# Patient Record
Sex: MALE | Race: WHITE | NOT HISPANIC OR LATINO | Employment: OTHER | URBAN - METROPOLITAN AREA
[De-identification: names, ages, dates, MRNs, and addresses within clinical notes are randomized per-mention and may not be internally consistent; named-entity substitution may affect disease eponyms.]

---

## 2018-10-23 ENCOUNTER — HOSPITAL ENCOUNTER (EMERGENCY)
Facility: HOSPITAL | Age: 38
Discharge: HOME | End: 2018-10-23
Attending: EMERGENCY MEDICINE
Payer: MEDICAID

## 2018-10-23 VITALS
DIASTOLIC BLOOD PRESSURE: 68 MMHG | RESPIRATION RATE: 16 BRPM | HEIGHT: 72 IN | OXYGEN SATURATION: 98 % | BODY MASS INDEX: 26.57 KG/M2 | SYSTOLIC BLOOD PRESSURE: 131 MMHG | WEIGHT: 196.19 LBS | TEMPERATURE: 98 F | HEART RATE: 56 BPM

## 2018-10-23 DIAGNOSIS — S62.501A CLOSED NONDISPLACED FRACTURE OF PHALANX OF RIGHT THUMB, UNSPECIFIED PHALANX, INITIAL ENCOUNTER: Primary | ICD-10-CM

## 2018-10-23 PROCEDURE — 25000003 PHARM REV CODE 250: Performed by: NURSE PRACTITIONER

## 2018-10-23 PROCEDURE — 29125 APPL SHORT ARM SPLINT STATIC: CPT

## 2018-10-23 PROCEDURE — 29130 APPL FINGER SPLINT STATIC: CPT | Mod: F5

## 2018-10-23 PROCEDURE — 99283 EMERGENCY DEPT VISIT LOW MDM: CPT | Mod: 25

## 2018-10-23 RX ORDER — HYDROCODONE BITARTRATE AND ACETAMINOPHEN 7.5; 325 MG/1; MG/1
1 TABLET ORAL EVERY 4 HOURS PRN
Qty: 12 TABLET | Refills: 0 | Status: SHIPPED | OUTPATIENT
Start: 2018-10-23

## 2018-10-23 RX ORDER — HYDROCODONE BITARTRATE AND ACETAMINOPHEN 10; 325 MG/1; MG/1
1 TABLET ORAL
Status: COMPLETED | OUTPATIENT
Start: 2018-10-23 | End: 2018-10-23

## 2018-10-23 RX ADMIN — HYDROCODONE BITARTRATE AND ACETAMINOPHEN 1 TABLET: 10; 325 TABLET ORAL at 12:10

## 2018-10-23 NOTE — ED PROVIDER NOTES
"Encounter Date: 10/23/2018       History     Chief Complaint   Patient presents with    Hand Pain     pt c/o pain to his Rt thumb, pt states he was working on his truck when a bolt broke and his thumb was going a different direction so he "popped it back and heard a lot of snaps"     38 year old male with complaint of right thumb pain since last night.  Pt reports that he struck his thumb on a metal bar as he attempted to remove bolt from back of truck.  Reports immediate onset of pain.  Worse with movement. No radiation of pain.  Constant aching pain.            Review of patient's allergies indicates:   Allergen Reactions    Fentanyl      History reviewed. No pertinent past medical history.  History reviewed. No pertinent surgical history.  History reviewed. No pertinent family history.  Social History     Tobacco Use    Smoking status: Never Smoker    Smokeless tobacco: Never Used   Substance Use Topics    Alcohol use: No     Frequency: Never    Drug use: Not on file     Review of Systems   Constitutional: Negative for fever.   HENT: Negative for sore throat.    Respiratory: Negative for shortness of breath.    Cardiovascular: Negative for chest pain.   Gastrointestinal: Negative for nausea.   Genitourinary: Negative for dysuria.   Musculoskeletal: Negative for back pain.        Right thumb pain    Skin: Negative for rash.   Neurological: Negative for weakness.   Hematological: Does not bruise/bleed easily.       Physical Exam     Initial Vitals [10/23/18 1144]   BP Pulse Resp Temp SpO2   (!) 140/83 (!) 58 18 98 °F (36.7 °C) 97 %      MAP       --         Physical Exam    Nursing note and vitals reviewed.  Constitutional: He appears well-developed and well-nourished.   HENT:   Head: Normocephalic and atraumatic.   Eyes: Conjunctivae are normal. Pupils are equal, round, and reactive to light.   Neck: Normal range of motion. Neck supple.   Cardiovascular: Normal rate, regular rhythm, normal heart sounds and " intact distal pulses.   Pulmonary/Chest: Breath sounds normal.   Abdominal: Soft. There is no rebound and no guarding.   Musculoskeletal: Normal range of motion.   Tenderness and bruising distal volar right thumb, no deformities, cap refill brisk   Neurological: He is alert.   Skin: Skin is warm and dry.   Psychiatric: He has a normal mood and affect. His behavior is normal. Thought content normal.         ED Course   Splint Application  Date/Time: 10/23/2018 12:26 PM  Performed by: Edgardo Meek NP  Authorized by: Nate Vizcarra MD   Comments: OCL thumb spica splint applied to right thumb: alignment good, neurovascular status intact        Labs Reviewed - No data to display       Imaging Results          X-Ray Hand 3 view Right (Final result)  Result time 10/23/18 12:16:06    Final result by Moses Herrera MD (10/23/18 12:16:06)                 Impression:      Nondisplaced fracture of the distal phalanx right thumb.      Electronically signed by: Moses Herrera MD  Date:    10/23/2018  Time:    12:16             Narrative:    EXAMINATION:  XR HAND COMPLETE 3 VIEW RIGHT    CLINICAL HISTORY:  Right thumb pain    TECHNIQUE:  Standard radiography performed.    COMPARISON:  None    FINDINGS:  There is a nondisplaced fracture of the shaft of the distal phalanx of the right thumb.    Deformity of the 5th metacarpal can be consistent with an old healed 5th metacarpal fracture.                                     Imaging Results          X-Ray Hand 3 view Right (Final result)  Result time 10/23/18 12:16:06    Final result by Moses Herrera MD (10/23/18 12:16:06)                 Impression:      Nondisplaced fracture of the distal phalanx right thumb.      Electronically signed by: Moses Herrera MD  Date:    10/23/2018  Time:    12:16             Narrative:    EXAMINATION:  XR HAND COMPLETE 3 VIEW RIGHT    CLINICAL HISTORY:  Right thumb pain    TECHNIQUE:  Standard radiography  performed.    COMPARISON:  None    FINDINGS:  There is a nondisplaced fracture of the shaft of the distal phalanx of the right thumb.    Deformity of the 5th metacarpal can be consistent with an old healed 5th metacarpal fracture.                                                  Clinical Impression:   The encounter diagnosis was Closed nondisplaced fracture of phalanx of right thumb, unspecified phalanx, initial encounter.                             Edgardo Meek NP  10/23/18 5846

## 2019-04-16 ENCOUNTER — HOSPITAL ENCOUNTER (EMERGENCY)
Facility: HOSPITAL | Age: 39
Discharge: HOME | End: 2019-04-16
Attending: EMERGENCY MEDICINE
Payer: MEDICAID

## 2019-04-16 VITALS
HEART RATE: 58 BPM | DIASTOLIC BLOOD PRESSURE: 71 MMHG | BODY MASS INDEX: 29.48 KG/M2 | WEIGHT: 199.06 LBS | TEMPERATURE: 98 F | HEIGHT: 69 IN | SYSTOLIC BLOOD PRESSURE: 136 MMHG | RESPIRATION RATE: 16 BRPM | OXYGEN SATURATION: 98 %

## 2019-04-16 DIAGNOSIS — M25.519 SHOULDER PAIN, ACUTE: Primary | ICD-10-CM

## 2019-04-16 PROCEDURE — 25000003 PHARM REV CODE 250: Performed by: PHYSICIAN ASSISTANT

## 2019-04-16 PROCEDURE — 99284 EMERGENCY DEPT VISIT MOD MDM: CPT | Mod: 25

## 2019-04-16 RX ORDER — METHYLPREDNISOLONE 4 MG/1
TABLET ORAL
Qty: 1 PACKAGE | Refills: 0 | Status: SHIPPED | OUTPATIENT
Start: 2019-04-16 | End: 2019-05-07

## 2019-04-16 RX ORDER — IBUPROFEN 800 MG/1
800 TABLET ORAL
Status: COMPLETED | OUTPATIENT
Start: 2019-04-16 | End: 2019-04-16

## 2019-04-16 RX ORDER — DICLOFENAC SODIUM 50 MG/1
50 TABLET, DELAYED RELEASE ORAL 2 TIMES DAILY
Qty: 20 TABLET | Refills: 0 | OUTPATIENT
Start: 2019-04-16 | End: 2023-11-18

## 2019-04-16 RX ADMIN — IBUPROFEN 800 MG: 800 TABLET, FILM COATED ORAL at 04:04

## 2019-04-16 NOTE — ED PROVIDER NOTES
"SCRIBE #1 NOTE: I, Corinne Mack, am scribing for, and in the presence of, JOSE Upton. I have scribed the entire note.      History      Chief Complaint   Patient presents with    Shoulder Pain     pt c/o pain to his Lt shoulder after working in the yard today, pt states he hear a "pop"       Review of patient's allergies indicates:   Allergen Reactions    Fentanyl         HPI   HPI    4/16/2019, 3:22 PM   History obtained from the patient      History of Present Illness: Oleksandr Stewart is a 38 y.o. male patient who presents to the Emergency Department for R shoulder pain which onset suddenly today. Pt was working in his yard when he lifted his RUE and heard a "pop". Pt is now having pain and DROM secondary to pain. Symptoms are constant and moderate in severity. Movement and palpation worsens the pt's pain. No mitigating factors reported. No associated sxs reported. Patient denies any fever, chills, CP, SOB, N/V/D, abd pain, back pain, neck pain, HA, dizziness, and all other sxs at this time. No prior Tx reported. No further complaints or concerns at this time.         Arrival mode: Personal vehicle    PCP: [facility]       Past Medical History:  History reviewed. No pertinent medical history.    Past Surgical History:  History reviewed. No pertinent surgical history.    Family History:  History reviewed. No pertinent family history.    Social History:  Social History     Tobacco Use    Smoking status: Never Smoker    Smokeless tobacco: Never Used   Substance and Sexual Activity    Alcohol use: No     Frequency: Never    Drug use: Unknown    Sexual activity: Unknown       ROS   Review of Systems   Constitutional: Negative for chills and fever.   Respiratory: Negative for cough and shortness of breath.    Cardiovascular: Negative for chest pain and leg swelling.   Gastrointestinal: Negative for abdominal pain, diarrhea, nausea and vomiting.   Musculoskeletal: Positive for arthralgias " "(R shoulder). Negative for back pain, neck pain and neck stiffness.   Skin: Negative for rash and wound.   Neurological: Negative for dizziness, light-headedness, numbness and headaches.   All other systems reviewed and are negative.    Physical Exam      Initial Vitals [04/16/19 1444]   BP Pulse Resp Temp SpO2   136/71 (!) 58 16 98 °F (36.7 °C) 98 %      MAP       --          Physical Exam  Nursing Notes and Vital Signs Reviewed.  Constitutional: Patient is in no acute distress. Well-developed and well-nourished.  Head: Atraumatic. Normocephalic.  Eyes: PERRL. EOM intact. Conjunctivae are not pale. No scleral icterus.  ENT: Mucous membranes are moist. Oropharynx is clear and symmetric.    Neck: Supple. Full ROM. No lymphadenopathy.  Cardiovascular: Regular rate. Regular rhythm. No murmurs, rubs, or gallops. Distal pulses are 2+ and symmetric.  Pulmonary/Chest: No respiratory distress. Clear to auscultation bilaterally. No wheezing or rales.  Abdominal: Soft and non-distended.  There is no tenderness.  No rebound, guarding, or rigidity.  Musculoskeletal: Moves all extremities. No obvious deformities. No edema.  R shoulder DROM secondary to pain. Anterior R shoulder TTP.  Skin: Warm and dry.  Neurological:  Alert, awake, and appropriate.  Normal speech.  No acute focal neurological deficits are appreciated.  Psychiatric: Normal affect. Good eye contact. Appropriate in content.    ED Course    Procedures  ED Vital Signs:  Vitals:    04/16/19 1444   BP: 136/71   Pulse: (!) 58   Resp: 16   Temp: 98 °F (36.7 °C)   TempSrc: Oral   SpO2: 98%   Weight: 90.3 kg (199 lb 1.2 oz)   Height: 5' 9" (1.753 m)       Abnormal Lab Results:  Labs Reviewed - No data to display     All Lab Results:  None    Imaging Results:  Imaging Results          X-Ray Shoulder Trauma Right (Final result)  Result time 04/16/19 15:38:13    Final result by Jayme Choudhary MD (04/16/19 15:38:13)                 Impression:      No acute fracture or " dislocation.      Electronically signed by: Jayme Choudhary MD  Date:    04/16/2019  Time:    15:38             Narrative:    EXAMINATION:  XR SHOULDER TRAUMA 3 VIEW RIGHT    CLINICAL HISTORY:  XR SHOULDER TRAUMA 3 VIEW RIGHTPain in unspecified shoulder    COMPARISON:  None    FINDINGS:  Three views of the right shoulder were obtained.    No evidence of acute fracture or dislocation.  Bony mineralization is normal.  Soft tissues are unremarkable.   Visualized lungs are clear.                                        The Emergency Provider reviewed the vital signs and test results, which are outlined above.    ED Discussion     4:11 PM: Reassessed pt at this time. Pt is awake, alert, and in no distress. Discussed with pt all pertinent ED information and results. Discussed pt dx and plan of tx. Gave pt all f/u and return to the ED instructions. All questions and concerns were addressed at this time. Pt expresses understanding of information and instructions, and is comfortable with plan to discharge. Pt is stable for discharge.    I discussed with patient and/or family/caretaker that evaluation in the ED does not suggest any emergent or life threatening medical conditions requiring immediate intervention beyond what was provided in the ED, and I believe patient is safe for discharge.  Regardless, an unremarkable evaluation in the ED does not preclude the development or presence of a serious of life threatening condition. As such, patient was instructed to return immediately for any worsening or change in current symptoms.    I discussed with patient and/or family/caretaker that negative X-ray does not rule out occult fracture or other soft tissue injury.  Persistent pain greater than 7-10 days or increased pain requires follow up, specifically with orthopedics.       ED Medication(s):  Medications   ibuprofen tablet 800 mg (800 mg Oral Given 4/16/19 0099)          Medication List      START taking these medications     diclofenac 50 MG EC tablet  Commonly known as:  VOLTAREN  Take 1 tablet (50 mg total) by mouth 2 (two) times daily.     methylPREDNISolone 4 mg tablet  Commonly known as:  MEDROL DOSEPACK  As directed        ASK your doctor about these medications    HYDROcodone-acetaminophen 7.5-325 mg per tablet  Commonly known as:  NORCO  Take 1 tablet by mouth every 4 (four) hours as needed for Pain.           Where to Get Your Medications      You can get these medications from any pharmacy    Bring a paper prescription for each of these medications  · diclofenac 50 MG EC tablet  · methylPREDNISolone 4 mg tablet         Follow-up Information     [facility]. Go in 2 days.    Specialty:  Orthopedics  Contact information:  [address] [place] [id number] [phone]  Additional information:  (off [facility]) 1st floor                   Medical Decision Making    Medical Decision Making:   Clinical Tests:   Radiological Study: Ordered and Reviewed           Scribe Attestation:   Scribe #1: I performed the above scribed service and the documentation accurately describes the services I performed. I attest to the accuracy of the note 04/16/2019.    Attending:   Physician Attestation Statement for Scribe #1: I, JOSE Upton, personally performed the services described in this documentation, as scribed by Corinne Mack, in my presence, and it is both accurate and complete.          Clinical Impression       ICD-10-CM ICD-9-CM   1. Shoulder pain, acute M25.519 719.41       Disposition:   Disposition: Discharged  Condition: Stable           JOSE Upton  04/17/19 3362

## 2023-04-12 ENCOUNTER — HOSPITAL ENCOUNTER (EMERGENCY)
Facility: HOSPITAL | Age: 43
Discharge: HOME | End: 2023-04-12
Attending: EMERGENCY MEDICINE
Payer: MEDICAID

## 2023-04-12 VITALS
HEIGHT: 69 IN | DIASTOLIC BLOOD PRESSURE: 75 MMHG | OXYGEN SATURATION: 98 % | TEMPERATURE: 99 F | WEIGHT: 170.44 LBS | HEART RATE: 78 BPM | SYSTOLIC BLOOD PRESSURE: 116 MMHG | BODY MASS INDEX: 25.24 KG/M2 | RESPIRATION RATE: 20 BRPM

## 2023-04-12 DIAGNOSIS — S61.233A PUNCTURE WOUND OF LEFT MIDDLE FINGER: Primary | ICD-10-CM

## 2023-04-12 DIAGNOSIS — M79.645 FINGER PAIN, LEFT: ICD-10-CM

## 2023-04-12 PROCEDURE — 90471 IMMUNIZATION ADMIN: CPT | Performed by: NURSE PRACTITIONER

## 2023-04-12 PROCEDURE — 99284 EMERGENCY DEPT VISIT MOD MDM: CPT | Mod: 25

## 2023-04-12 PROCEDURE — 63600175 PHARM REV CODE 636 W HCPCS: Performed by: NURSE PRACTITIONER

## 2023-04-12 PROCEDURE — 64450 NJX AA&/STRD OTHER PN/BRANCH: CPT

## 2023-04-12 PROCEDURE — 90715 TDAP VACCINE 7 YRS/> IM: CPT | Performed by: NURSE PRACTITIONER

## 2023-04-12 RX ORDER — DICLOFENAC SODIUM 50 MG/1
50 TABLET, DELAYED RELEASE ORAL 3 TIMES DAILY PRN
Qty: 15 TABLET | Refills: 0 | OUTPATIENT
Start: 2023-04-12 | End: 2023-11-18

## 2023-04-12 RX ORDER — CEPHALEXIN 500 MG/1
500 CAPSULE ORAL 4 TIMES DAILY
Qty: 20 CAPSULE | Refills: 0 | Status: SHIPPED | OUTPATIENT
Start: 2023-04-12 | End: 2023-04-17

## 2023-04-12 RX ORDER — BUPIVACAINE HYDROCHLORIDE 5 MG/ML
5 INJECTION, SOLUTION EPIDURAL; INTRACAUDAL
Status: DISCONTINUED | OUTPATIENT
Start: 2023-04-12 | End: 2023-04-12 | Stop reason: HOSPADM

## 2023-04-12 RX ORDER — LIDOCAINE HYDROCHLORIDE 10 MG/ML
10 INJECTION, SOLUTION EPIDURAL; INFILTRATION; INTRACAUDAL; PERINEURAL
Status: DISCONTINUED | OUTPATIENT
Start: 2023-04-12 | End: 2023-04-12 | Stop reason: HOSPADM

## 2023-04-12 RX ADMIN — TETANUS TOXOID, REDUCED DIPHTHERIA TOXOID AND ACELLULAR PERTUSSIS VACCINE, ADSORBED 0.5 ML: 5; 2.5; 8; 8; 2.5 SUSPENSION INTRAMUSCULAR at 05:04

## 2023-04-12 NOTE — FIRST PROVIDER EVALUATION
Emergency Department TeleTriage Encounter Note      CHIEF COMPLAINT    Chief Complaint   Patient presents with    Hand Pain     Drill bit went through L 3rd finger today.       VITAL SIGNS   Initial Vitals [04/12/23 1705]   BP Pulse Resp Temp SpO2   116/75 78 20 99 °F (37.2 °C) 98 %      MAP       --            ALLERGIES    Review of patient's allergies indicates:   Allergen Reactions    Fentanyl        PROVIDER TRIAGE NOTE  This is a teletriage evaluation of a 42 y.o. male presenting to the ED with c/o puncture wound to left middle finger; accidentally drilled a drill bit through it.  Unsure of last tetanus.  Limited physical exam via telehealth: The patient is awake, alert, answering questions appropriately and is not in respiratory distress. Initial orders will be placed and care will be transferred to an alternate provider when patient is roomed for a full evaluation. Any additional orders and the final disposition will be determined by that provider.         ORDERS  Labs Reviewed - No data to display    ED Orders (720h ago, onward)      Start Ordered     Status Ordering Provider    04/13/23 0600 04/12/23 1721  Wound care routine - Clean wound  Daily        Comments: Clean Wound    Ordered LEVI CARRILLO    04/13/23 0600 04/12/23 1721  Wound care routine - Irrigate wound  Daily        Comments: Irrigate Wound    Ordered LEVI CARRILLO    04/13/23 0600 04/12/23 1721  Wound care routine - Sterile Gloves to Bedside  Daily        Comments: Provide sterile gloves to bedside    Ordered LEVI CARRILLO    04/12/23 1730 04/12/23 1721  LIDOcaine HCL 10 mg/ml (1%) injection 10 mL  ED 1 Time         Ordered LEVI CARRILLO    04/12/23 1730 04/12/23 1721  Tdap (BOOSTRIX) vaccine injection 0.5 mL  ED 1 Time         Ordered LEVI CARRILLO    04/12/23 1721 04/12/23 1721  X-Ray Finger 2 or More Views Left  1 time imaging         Ordered LEVI CARRILLO    04/12/23 1721 04/12/23 1721  Provide suture tray to patient bedside  Once          Ordered LEVI CARRILLO    04/12/23 1721 04/12/23 1721  Change dressing - apply dry sterile dressing  Once        Comments: Apply dry sterile dressing.    Ordered LEVI CARRILLO              Virtual Visit Note: The provider triage portion of this emergency department evaluation and documentation was performed via [email or web address], a HIPAA-compliant telemedicine application, in concert with a tele-presenter in the room. A face to face patient evaluation with one of my colleagues will occur once the patient is placed in an emergency department room.      DISCLAIMER: This note was prepared with [employer] voice recognition transcription software. Garbled syntax, mangled pronouns, and other bizarre constructions may be attributed to that software system.

## 2023-04-13 NOTE — ED PROVIDER NOTES
HISTORY     Chief Complaint   Patient presents with    Hand Pain     Drill bit went through L 3rd finger today.     Review of patient's allergies indicates:   Allergen Reactions    Fentanyl         HPI    42-year-old male patient presents to the emergency room with left middle finger injury.  Patient states a drill bit went through finger.  Bleeding was controlled prior to arrival.  No other treatment was 9 prior to arrival.  Patient is unsure of last tetanus shot.  Patient describes pain as throbbing 9/10.    The history is provided by the patient.      PCP: [facility]     Past Medical History:  No past medical history on file.     Past Surgical History:  No past surgical history on file.     Family History:  No family history on file.     Social History:  Social History     Tobacco Use    Smoking status: Never    Smokeless tobacco: Never   Substance and Sexual Activity    Alcohol use: No    Drug use: Not on file    Sexual activity: Not on file         ROS   Review of Systems   Constitutional:  Negative for fever.   HENT:  Negative for sore throat.    Respiratory:  Negative for shortness of breath.    Cardiovascular:  Negative for chest pain.   Gastrointestinal:  Negative for nausea.   Genitourinary:  Negative for dysuria.   Musculoskeletal:  Negative for back pain.        Finger injury   Skin:  Negative for rash.   Neurological:  Negative for weakness.   Hematological:  Does not bruise/bleed easily.     PHYSICAL EXAM     Initial Vitals [04/12/23 1705]   BP Pulse Resp Temp SpO2   116/75 78 20 99 °F (37.2 °C) 98 %      MAP       --           Physical Exam    Constitutional: He appears well-developed and well-nourished. No distress.   HENT:   Head: Normocephalic and atraumatic.   Eyes: Conjunctivae are normal. Pupils are equal, round, and reactive to light.   Neck: Neck supple.   Normal range of motion.  Cardiovascular:  Normal rate, regular rhythm and normal heart sounds.           Pulmonary/Chest:  "Breath sounds normal.   Abdominal: Abdomen is soft. Bowel sounds are normal.   Musculoskeletal:         General: Normal range of motion.        Hands:       Cervical back: Normal range of motion and neck supple.     Neurological: He is alert and oriented to person, place, and time. No cranial nerve deficit.   Skin: Skin is warm and dry.   Psychiatric: He has a normal mood and affect.        ED COURSE   Nerve Block    Date/Time: 4/12/2023 8:30 PM  Location procedure was performed: [facility] EMERGENCY DEPARTMENT  Performed by: Rasheed Cagle NP  Authorized by: Melody Parish Do, MD   Indications: pain relief  Body area: upper extremity  Nerve: digital  Laterality: left  Preparation: Patient was prepped and draped in the usual sterile fashion.  Patient position: sitting  Needle size: 27 G  Location technique: anatomical landmarks  Local Anesthetic: lidocaine 1% without epinephrine and bupivacaine 0.5% without epinephrine  Complications: No  Outcome: pain improved  Patient tolerance: Patient tolerated the procedure well with no immediate complications      ED ONGOING VITALS:  Vitals:    04/12/23 1705   BP: 116/75   Pulse: 78   Resp: 20   Temp: 99 °F (37.2 °C)   TempSrc: Oral   SpO2: 98%   Weight: 77.3 kg (170 lb 6.7 oz)   Height: 5' 9" (1.753 m)         ABNORMAL LAB VALUES:  Labs Reviewed - No data to display      ALL LAB VALUES:        RADIOLOGY STUDIES:  Imaging Results              X-Ray Finger 2 or More Views Left (Final result)  Result time 04/12/23 17:44:00      Final result by Javier Lopez MD (04/12/23 17:44:00)                   Impression:      As above      Electronically signed by: Javier Lopez  Date:    04/12/2023  Time:    17:44               Narrative:    EXAMINATION:  XR FINGER 2 OR MORE VIEWS LEFT    CLINICAL HISTORY:  XR FINGER 2 OR MORE VIEWS LEFT    COMPARISON:  None    FINDINGS:  Multiple radiographic views  were obtained.    Tiny ossific density along the palmar and ulnar aspect of the 3rd digit may " relate to old injury.  Otherwise no acute fracture or dislocation                                                The above vital signs and test results have been reviewed by the emergency provider.     ED Medications:  Discharge Medication List as of 4/12/2023  6:05 PM        START taking these medications    Details   cephALEXin (KEFLEX) 500 MG capsule Take 1 capsule (500 mg total) by mouth 4 (four) times daily. for 5 days, Starting Wed 4/12/2023, Until Mon 4/17/2023, Print      !! diclofenac (VOLTAREN) 50 MG EC tablet Take 1 tablet (50 mg total) by mouth 3 (three) times daily as needed., Starting Wed 4/12/2023, Print       !! - Potential duplicate medications found. Please discuss with provider.        Discharge Medications:  Discharge Medication List as of 4/12/2023  6:05 PM        START taking these medications    Details   cephALEXin (KEFLEX) 500 MG capsule Take 1 capsule (500 mg total) by mouth 4 (four) times daily. for 5 days, Starting Wed 4/12/2023, Until Mon 4/17/2023, Print      !! diclofenac (VOLTAREN) 50 MG EC tablet Take 1 tablet (50 mg total) by mouth 3 (three) times daily as needed., Starting Wed 4/12/2023, Print       !! - Potential duplicate medications found. Please discuss with provider.          Follow-up Information       Schedule an appointment as soon as possible for a visit  with PCP.               [facility] - Emergency Dept..    Specialty: Emergency Medicine  Contact information:  [address] [facility] [address] [phone]                           Wound was irrigated extensively with sterile water and Hibiclens mixture.  zerowet was used over 500 cc irrigation      I discussed with patient and/or family/caretaker that evaluation in the ED does not suggest any emergent or life threatening medical conditions requiring immediate intervention beyond what was provided in the ED, and I believe patient is safe for discharge. Regardless, an unremarkable evaluation in  the ED does not preclude the development or presence of a serious or life threatening condition. As such, patient was instructed to return immediately for any worsening or change in current symptoms.    I discussed wound care precautions with patient and/or family/caretaker; specifically that all wounds have risk of infection despite efforts to cleanse and debride the wound; and there is a risk of an occult foreign body (and thus increased risk of infection) despite a negative examination.  I discussed with patient need to return for any signs of infection, specifically redness, increased pain, fever, drainage of pus, or any concern, immediately.      MEDICAL DECISION MAKING                 CLINICAL IMPRESSION       ICD-10-CM ICD-9-CM   1. Puncture wound of left middle finger  S61.233A 883.0   2. Finger pain, left  M79.645 729.5       Disposition:   Disposition: Discharged  Condition: Stable         Rasheed Cagle NP  04/13/23 0126

## 2023-11-18 ENCOUNTER — HOSPITAL ENCOUNTER (EMERGENCY)
Facility: HOSPITAL | Age: 43
Discharge: HOME | End: 2023-11-18
Attending: FAMILY MEDICINE
Payer: MEDICAID

## 2023-11-18 VITALS
DIASTOLIC BLOOD PRESSURE: 78 MMHG | RESPIRATION RATE: 18 BRPM | TEMPERATURE: 98 F | WEIGHT: 222.69 LBS | BODY MASS INDEX: 32.88 KG/M2 | SYSTOLIC BLOOD PRESSURE: 131 MMHG | HEART RATE: 70 BPM | OXYGEN SATURATION: 97 %

## 2023-11-18 DIAGNOSIS — R07.9 CHEST PAIN: ICD-10-CM

## 2023-11-18 DIAGNOSIS — S20.219A CHEST WALL CONTUSION, UNSPECIFIED LATERALITY, INITIAL ENCOUNTER: Primary | ICD-10-CM

## 2023-11-18 DIAGNOSIS — R07.89 CHEST WALL PAIN: ICD-10-CM

## 2023-11-18 PROCEDURE — 99284 EMERGENCY DEPT VISIT MOD MDM: CPT | Mod: 25

## 2023-11-18 RX ORDER — DICLOFENAC SODIUM 75 MG/1
75 TABLET, DELAYED RELEASE ORAL 2 TIMES DAILY
Qty: 14 TABLET | Refills: 0 | Status: SHIPPED | OUTPATIENT
Start: 2023-11-18

## 2023-11-18 NOTE — Clinical Note
"Oleksandr Alex "Oleksandr Alex" Pat was seen and treated in our emergency department on 11/18/2023.  He may return to work on 11/20/2023.       If you have any questions or concerns, please don't hesitate to call.      Malik Ferrera NP"

## 2023-11-22 NOTE — ED PROVIDER NOTES
Encounter Date: 11/18/2023       History     Chief Complaint   Patient presents with    Fall     Pt. C/o falling down from 8 ft scaffold, a week ago and is still having pain. Pt states he fell directly on his chest and the boards of the scaffold hit the back of leg. Pt states he also is having leg pain      Patient complains chest wall pain after falling from a scalp whole several days ago.        Review of patient's allergies indicates:   Allergen Reactions    Fentanyl      No past medical history on file.  No past surgical history on file.  No family history on file.  Social History     Tobacco Use    Smoking status: Never    Smokeless tobacco: Never   Substance Use Topics    Alcohol use: No     Review of Systems   Constitutional:  Negative for fever.   HENT:  Negative for sore throat.    Respiratory:  Negative for shortness of breath.    Cardiovascular:  Negative for chest pain.   Gastrointestinal:  Negative for nausea.   Genitourinary:  Negative for dysuria.   Musculoskeletal:  Negative for back pain.   Skin:  Negative for rash.   Neurological:  Negative for weakness.   Hematological:  Does not bruise/bleed easily.       Physical Exam     Initial Vitals [11/18/23 1912]   BP Pulse Resp Temp SpO2   131/78 70 18 98 °F (36.7 °C) 97 %      MAP       --         Physical Exam    Nursing note and vitals reviewed.  Constitutional: He appears well-developed and well-nourished.   HENT:   Head: Normocephalic and atraumatic.   Eyes: Conjunctivae are normal. Pupils are equal, round, and reactive to light.   Neck: Neck supple.   Normal range of motion.  Cardiovascular:  Normal rate, regular rhythm, normal heart sounds and intact distal pulses.           Pulmonary/Chest: Breath sounds normal.   Abdominal: Abdomen is soft. There is no rebound and no guarding.   Musculoskeletal:         General: Normal range of motion.      Cervical back: Normal range of motion and neck supple.     Neurological: He is alert.   Skin: Skin is warm  and dry.   Psychiatric: He has a normal mood and affect. His behavior is normal. Thought content normal.         ED Course   Procedures  Labs Reviewed - No data to display       Imaging Results              X-Ray Chest 1 View (Final result)  Result time 11/18/23 20:37:39      Final result by León Ballard MD (11/18/23 20:37:39)                   Impression:      No acute abnormality.      Electronically signed by: León Ballard  Date:    11/18/2023  Time:    20:37               Narrative:    EXAMINATION:  XR CHEST 1 VIEW    CLINICAL HISTORY:  Other chest pain    TECHNIQUE:  Single frontal view of the chest was performed.    COMPARISON:  None    FINDINGS:  The lungs are clear, with normal appearance of pulmonary vasculature and no pleural effusion or pneumothorax.    The cardiac silhouette is normal in size. The hilar and mediastinal contours are unremarkable.    Bones are intact.                                       Medications - No data to display  Medical Decision Making  Amount and/or Complexity of Data Reviewed  Radiology: ordered.    Risk  Prescription drug management.                                   Clinical Impression:  Final diagnoses:  [R07.9] Chest pain  [R07.89] Chest wall pain  [S20.219A] Chest wall contusion, unspecified laterality, initial encounter (Primary)          ED Disposition Condition    Discharge Stable          ED Prescriptions       Medication Sig Dispense Start Date End Date Auth. Provider    diclofenac (VOLTAREN) 75 MG EC tablet Take 1 tablet (75 mg total) by mouth 2 (two) times daily. 14 tablet 11/18/2023 -- Malik Ferrera NP          Follow-up Information       Follow up With Specialties Details Why Contact Info    pcp  Schedule an appointment as soon as possible for a visit  As needed              Malik Ferrera NP  11/21/23 8332

## 2025-08-12 ENCOUNTER — HOSPITAL ENCOUNTER (EMERGENCY)
Facility: HOSPITAL | Age: 45
Discharge: HOME | End: 2025-08-12
Attending: EMERGENCY MEDICINE

## 2025-08-12 VITALS
HEIGHT: 70 IN | BODY MASS INDEX: 35.07 KG/M2 | RESPIRATION RATE: 16 BRPM | WEIGHT: 245 LBS | HEART RATE: 73 BPM | DIASTOLIC BLOOD PRESSURE: 77 MMHG | SYSTOLIC BLOOD PRESSURE: 113 MMHG | TEMPERATURE: 98 F | OXYGEN SATURATION: 99 %

## 2025-08-12 DIAGNOSIS — S05.01XA ABRASION OF RIGHT CORNEA, INITIAL ENCOUNTER: Primary | ICD-10-CM

## 2025-08-12 PROCEDURE — 99284 EMERGENCY DEPT VISIT MOD MDM: CPT

## 2025-08-12 PROCEDURE — 25000003 PHARM REV CODE 250: Performed by: REGISTERED NURSE

## 2025-08-12 RX ORDER — DICLOFENAC SODIUM 75 MG/1
75 TABLET, DELAYED RELEASE ORAL 2 TIMES DAILY PRN
Qty: 20 TABLET | Refills: 0 | Status: SHIPPED | OUTPATIENT
Start: 2025-08-12

## 2025-08-12 RX ORDER — TOBRAMYCIN 3 MG/ML
1 SOLUTION/ DROPS OPHTHALMIC EVERY 4 HOURS
Qty: 5 ML | Refills: 0 | Status: SHIPPED | OUTPATIENT
Start: 2025-08-12

## 2025-08-12 RX ORDER — FLUORESCEIN SODIUM 1 MG/MG
1 STRIP OPHTHALMIC
Status: COMPLETED | OUTPATIENT
Start: 2025-08-12 | End: 2025-08-12

## 2025-08-12 RX ORDER — PROPARACAINE HYDROCHLORIDE 5 MG/ML
1 SOLUTION/ DROPS OPHTHALMIC
Status: COMPLETED | OUTPATIENT
Start: 2025-08-12 | End: 2025-08-12

## 2025-08-12 RX ADMIN — PROPARACAINE HYDROCHLORIDE 1 DROP: 5 SOLUTION/ DROPS OPHTHALMIC at 05:08

## 2025-08-12 RX ADMIN — FLUORESCEIN SODIUM 1 EACH: 1 STRIP OPHTHALMIC at 05:08
